# Patient Record
Sex: FEMALE | Race: BLACK OR AFRICAN AMERICAN | NOT HISPANIC OR LATINO
[De-identification: names, ages, dates, MRNs, and addresses within clinical notes are randomized per-mention and may not be internally consistent; named-entity substitution may affect disease eponyms.]

---

## 2017-07-01 ENCOUNTER — HEALTH MAINTENANCE LETTER (OUTPATIENT)
Age: 32
End: 2017-07-01

## 2019-10-03 ENCOUNTER — HEALTH MAINTENANCE LETTER (OUTPATIENT)
Age: 34
End: 2019-10-03

## 2020-11-07 ENCOUNTER — HEALTH MAINTENANCE LETTER (OUTPATIENT)
Age: 35
End: 2020-11-07

## 2021-09-05 ENCOUNTER — HEALTH MAINTENANCE LETTER (OUTPATIENT)
Age: 36
End: 2021-09-05

## 2021-12-26 ENCOUNTER — HEALTH MAINTENANCE LETTER (OUTPATIENT)
Age: 36
End: 2021-12-26

## 2022-10-23 ENCOUNTER — HEALTH MAINTENANCE LETTER (OUTPATIENT)
Age: 37
End: 2022-10-23

## 2023-04-02 ENCOUNTER — HEALTH MAINTENANCE LETTER (OUTPATIENT)
Age: 38
End: 2023-04-02

## 2024-11-06 ENCOUNTER — HOSPITAL ENCOUNTER (EMERGENCY)
Facility: CLINIC | Age: 39
Discharge: HOME | End: 2024-11-06
Attending: EMERGENCY MEDICINE | Admitting: EMERGENCY MEDICINE

## 2024-11-06 VITALS
OXYGEN SATURATION: 97 % | DIASTOLIC BLOOD PRESSURE: 98 MMHG | RESPIRATION RATE: 20 BRPM | TEMPERATURE: 98.5 F | HEART RATE: 74 BPM | SYSTOLIC BLOOD PRESSURE: 147 MMHG

## 2024-11-06 DIAGNOSIS — K08.89 PAIN, DENTAL: ICD-10-CM

## 2024-11-06 PROCEDURE — 99284 EMERGENCY DEPT VISIT MOD MDM: CPT | Performed by: EMERGENCY MEDICINE

## 2024-11-06 PROCEDURE — 99283 EMERGENCY DEPT VISIT LOW MDM: CPT | Performed by: EMERGENCY MEDICINE

## 2024-11-06 RX ORDER — IBUPROFEN 600 MG/1
600 TABLET, FILM COATED ORAL EVERY 8 HOURS PRN
Qty: 30 TABLET | Refills: 0 | Status: SHIPPED | OUTPATIENT
Start: 2024-11-06

## 2024-11-06 RX ORDER — PENICILLIN V POTASSIUM 500 MG/1
500 TABLET, FILM COATED ORAL 2 TIMES DAILY
Qty: 14 TABLET | Refills: 0 | Status: SHIPPED | OUTPATIENT
Start: 2024-11-06 | End: 2024-11-13

## 2024-11-06 RX ORDER — HYDROCODONE BITARTRATE AND ACETAMINOPHEN 5; 325 MG/1; MG/1
1 TABLET ORAL EVERY 6 HOURS PRN
Qty: 10 TABLET | Refills: 0 | Status: SHIPPED | OUTPATIENT
Start: 2024-11-06 | End: 2024-11-09

## 2024-11-06 ASSESSMENT — COLUMBIA-SUICIDE SEVERITY RATING SCALE - C-SSRS
2. HAVE YOU ACTUALLY HAD ANY THOUGHTS OF KILLING YOURSELF IN THE PAST MONTH?: NO
1. IN THE PAST MONTH, HAVE YOU WISHED YOU WERE DEAD OR WISHED YOU COULD GO TO SLEEP AND NOT WAKE UP?: NO
6. HAVE YOU EVER DONE ANYTHING, STARTED TO DO ANYTHING, OR PREPARED TO DO ANYTHING TO END YOUR LIFE?: NO

## 2024-11-06 NOTE — DISCHARGE INSTRUCTIONS
Orajel or Anbesol to affected area. (You may obtain this from any pharmacy)  Tylenol or Ibuprofen for pain.  Use prescription medication as directed.  Follow up with your Dentist or a dental clinic listed below.    Many of these clinics offer a sliding fee option for patients that qualify, and see patients on a walk-in or same day basis. Please call each clinic directly. As services, hours, fees and policies vary greatly.    [place]:  [facility]     [phone]  [facility] ([facility]) [phone]  [facility]  [phone]  [facility]      [phone]   [facility]    [phone]  [facility]  [phone]  [facility]) [phone]  [facility]     [phone]  [facility]   [phone]  [facility] (cash only)   [phone]  [facility]    [phone] (adults)          [phone] (children)    [place]:  [facility]     [phone]; [phone]  [facility]     [phone]  [facility]     [phone]  [facility] (free, limited)    [phone]    Multiple Locations:  [facility]       [phone]

## 2024-11-06 NOTE — ED PROVIDER NOTES
[facility] EMERGENCY DEPARTMENT ([facility])    11/06/24       ED PROVIDER NOTE     History   No chief complaint on file.    HPI  Robin Newell is a 29 year old female no past medical history in Epic who presents the emergency department with dental pain.  Patient reports her left lower wisdom tooth is coming in and causing pain and is requesting a referral to [facility]. NO fever, She tried taking tylenol for pain.       Past Medical History  No past medical history on file.  No past surgical history on file.  HYDROcodone-acetaminophen (NORCO) 5-325 MG tablet  ibuprofen (ADVIL/MOTRIN) 600 MG tablet  penicillin V (VEETID) 500 MG tablet      Allergies   Allergen Reactions    Codeine Hives     Family History  No family history on file.  Social History       A medically appropriate review of systems was performed with pertinent positives and negatives noted in the HPI, and all other systems negative.    Physical Exam      Physical Exam  Constitutional:       General: She is not in acute distress.     Appearance: She is not ill-appearing, toxic-appearing or diaphoretic.   HENT:      Mouth/Throat:      Comments: Left lower molar that is erupting and causing some mild swelling.  Mild tenderness to touch.  No other dental abscesses.  No trismus.  Neurological:      General: No focal deficit present.      Mental Status: She is oriented to person, place, and time.   Psychiatric:         Mood and Affect: Mood normal.         Behavior: Behavior normal.         Thought Content: Thought content normal.           ED Course, Procedures, & Data      Procedures                No results found for any visits on 11/06/24.  Medications - No data to display  Labs Ordered and Resulted from Time of ED Arrival to Time of ED Departure - No data to display  No orders to display          Critical care was not performed.     Medical Decision Making  The patient's presentation was of low complexity (an acute and uncomplicated  illness or injury).    The patient's evaluation involved:  history and exam without other MDM data elements    The patient's management necessitated moderate risk (prescription drug management including medications given in the ED).    Assessment & Plan    Patient is a 39-year-old female who presents to the ER due to dental pain from a wisdom tooth is coming in in her left lower jaw.  Patient has no trismus.  Patient will be referred to an outpatient dentist for further treatment.  She will be given some penicillin to help decrease the swelling and some pain medications for discharge.  No other acute issues.  Patient stable for follow-up.    I have reviewed the nursing notes. I have reviewed the findings, diagnosis, plan and need for follow up with the patient.    New Prescriptions    No medications on file       Final diagnoses:   Pain, dental   I, Bita Davenport, am serving as a trained medical scribe to document services personally performed by Marcela Linton MD, MD, based on the provider's statements to me.     IMarcela MD, MD, was physically present and have reviewed and verified the accuracy of this note documented by Bita Davenport.     Marcela Linton MD  [facility] EMERGENCY DEPARTMENT  11/6/2024     Marcela Linton MD  11/06/24 [phone]

## 2024-11-06 NOTE — ED TRIAGE NOTES
Pt arrives with c/o of tooth pain. Pt reports it is wisdom tooth that is causing pain. Tooth was to eventually be extracted but thought it wouldn't be for some time.

## 2024-12-28 ENCOUNTER — HEALTH MAINTENANCE LETTER (OUTPATIENT)
Age: 39
End: 2024-12-28

## 2025-03-23 ENCOUNTER — HEALTH MAINTENANCE LETTER (OUTPATIENT)
Age: 40
End: 2025-03-23

## 2025-06-14 ENCOUNTER — HOSPITAL ENCOUNTER (EMERGENCY)
Facility: HOSPITAL | Age: 40
Discharge: HOME | End: 2025-06-14
Attending: EMERGENCY MEDICINE | Admitting: EMERGENCY MEDICINE
Payer: MEDICARE

## 2025-06-14 ENCOUNTER — APPOINTMENT (OUTPATIENT)
Dept: CT IMAGING | Facility: HOSPITAL | Age: 40
End: 2025-06-14
Attending: EMERGENCY MEDICINE
Payer: MEDICARE

## 2025-06-14 ENCOUNTER — RESULTS FOLLOW-UP (OUTPATIENT)
Dept: NURSING | Facility: CLINIC | Age: 40
End: 2025-06-14

## 2025-06-14 VITALS
TEMPERATURE: 97.6 F | HEIGHT: 65 IN | HEART RATE: 85 BPM | OXYGEN SATURATION: 97 % | WEIGHT: 236 LBS | DIASTOLIC BLOOD PRESSURE: 60 MMHG | SYSTOLIC BLOOD PRESSURE: 128 MMHG | RESPIRATION RATE: 16 BRPM | BODY MASS INDEX: 39.32 KG/M2

## 2025-06-14 DIAGNOSIS — R10.9 ABDOMINAL PAIN: ICD-10-CM

## 2025-06-14 DIAGNOSIS — R11.2 NAUSEA VOMITING AND DIARRHEA: ICD-10-CM

## 2025-06-14 DIAGNOSIS — R19.7 NAUSEA VOMITING AND DIARRHEA: ICD-10-CM

## 2025-06-14 LAB
ADV 40+41 DNA STL QL NAA+NON-PROBE: NEGATIVE
ALBUMIN SERPL BCG-MCNC: 4.6 G/DL (ref 3.5–5.2)
ALBUMIN UR-MCNC: 20 MG/DL
ALP SERPL-CCNC: 65 U/L (ref 40–150)
ALT SERPL W P-5'-P-CCNC: 30 U/L (ref 0–50)
ANION GAP SERPL CALCULATED.3IONS-SCNC: 14 MMOL/L (ref 7–15)
APPEARANCE UR: ABNORMAL
AST SERPL W P-5'-P-CCNC: 48 U/L (ref 0–45)
ASTRO TYP 1-8 RNA STL QL NAA+NON-PROBE: NEGATIVE
BACTERIA #/AREA URNS HPF: ABNORMAL /HPF
BILIRUB DIRECT SERPL-MCNC: 0.41 MG/DL (ref 0–0.3)
BILIRUB SERPL-MCNC: 1.4 MG/DL
BILIRUB UR QL STRIP: NEGATIVE
BUN SERPL-MCNC: 9.6 MG/DL (ref 6–20)
C CAYETANENSIS DNA STL QL NAA+NON-PROBE: NEGATIVE
C DIFF GDH STL QL IA: NEGATIVE
C DIFF TOX A+B STL QL IA: NEGATIVE
C DIFF TOX B STL QL: POSITIVE
CALCIUM SERPL-MCNC: 9 MG/DL (ref 8.8–10.4)
CAMPYLOBACTER DNA SPEC NAA+PROBE: NEGATIVE
CHLORIDE SERPL-SCNC: 102 MMOL/L (ref 98–107)
COLOR UR AUTO: YELLOW
CREAT SERPL-MCNC: 0.77 MG/DL (ref 0.51–0.95)
CRYPTOSP DNA STL QL NAA+NON-PROBE: NEGATIVE
E COLI O157 DNA STL QL NAA+NON-PROBE: NORMAL
E HISTOLYT DNA STL QL NAA+NON-PROBE: NEGATIVE
EAEC ASTA GENE ISLT QL NAA+PROBE: NEGATIVE
EC STX1+STX2 GENES STL QL NAA+NON-PROBE: NEGATIVE
EGFRCR SERPLBLD CKD-EPI 2021: >90 ML/MIN/1.73M2
EPEC EAE GENE STL QL NAA+NON-PROBE: NEGATIVE
ERYTHROCYTE [DISTWIDTH] IN BLOOD BY AUTOMATED COUNT: 12.7 % (ref 10–15)
ETEC LTA+ST1A+ST1B TOX ST NAA+NON-PROBE: NEGATIVE
ETHANOL SERPL-MCNC: <0.01 G/DL
G LAMBLIA DNA STL QL NAA+NON-PROBE: NEGATIVE
GLUCOSE SERPL-MCNC: 108 MG/DL (ref 70–99)
GLUCOSE UR STRIP-MCNC: NEGATIVE MG/DL
HCG SERPL QL: NEGATIVE
HCO3 SERPL-SCNC: 26 MMOL/L (ref 22–29)
HCT VFR BLD AUTO: 41.5 % (ref 35–47)
HGB BLD-MCNC: 14.1 G/DL (ref 11.7–15.7)
HGB UR QL STRIP: NEGATIVE
KETONES UR STRIP-MCNC: ABNORMAL MG/DL
LABORATORY COMMENT REPORT: ABNORMAL
LEUKOCYTE ESTERASE UR QL STRIP: NEGATIVE
LIPASE SERPL-CCNC: 29 U/L (ref 13–60)
MAGNESIUM SERPL-MCNC: 1.8 MG/DL (ref 1.7–2.3)
MCH RBC QN AUTO: 30.3 PG (ref 26.5–33)
MCHC RBC AUTO-ENTMCNC: 34 G/DL (ref 31.5–36.5)
MCV RBC AUTO: 89 FL (ref 78–100)
MUCOUS THREADS #/AREA URNS LPF: PRESENT /LPF
NITRATE UR QL: NEGATIVE
NOROVIRUS GI+II RNA STL QL NAA+NON-PROBE: NEGATIVE
P SHIGELLOIDES DNA STL QL NAA+NON-PROBE: NEGATIVE
PH UR STRIP: 6 [PH] (ref 5–7)
PHOSPHATE SERPL-MCNC: 3.9 MG/DL (ref 2.5–4.5)
PLATELET # BLD AUTO: 356 10E3/UL (ref 150–450)
POTASSIUM SERPL-SCNC: 3.6 MMOL/L (ref 3.4–5.3)
PROT SERPL-MCNC: 7.5 G/DL (ref 6.4–8.3)
RBC # BLD AUTO: 4.66 10E6/UL (ref 3.8–5.2)
RBC URINE: <1 /HPF
RVA RNA STL QL NAA+NON-PROBE: NEGATIVE
SALMONELLA SP RPOD STL QL NAA+PROBE: NEGATIVE
SAPO I+II+IV+V RNA STL QL NAA+NON-PROBE: NEGATIVE
SHIGELLA SP+EIEC IPAH ST NAA+NON-PROBE: NEGATIVE
SODIUM SERPL-SCNC: 142 MMOL/L (ref 135–145)
SP GR UR STRIP: 1.03 (ref 1–1.03)
SQUAMOUS EPITHELIAL: 11 /HPF
UROBILINOGEN UR STRIP-MCNC: NORMAL MG/DL
V CHOLERAE DNA SPEC QL NAA+PROBE: NEGATIVE
VIBRIO DNA SPEC NAA+PROBE: NEGATIVE
WBC # BLD AUTO: 5.8 10E3/UL (ref 4–11)
WBC URINE: 2 /HPF
Y ENTEROCOL DNA STL QL NAA+PROBE: NEGATIVE

## 2025-06-14 PROCEDURE — 250N000011 HC RX IP 250 OP 636: Performed by: EMERGENCY MEDICINE

## 2025-06-14 PROCEDURE — 87324 CLOSTRIDIUM AG IA: CPT | Performed by: EMERGENCY MEDICINE

## 2025-06-14 PROCEDURE — 87507 IADNA-DNA/RNA PROBE TQ 12-25: CPT | Performed by: EMERGENCY MEDICINE

## 2025-06-14 PROCEDURE — 87493 C DIFF AMPLIFIED PROBE: CPT | Performed by: EMERGENCY MEDICINE

## 2025-06-14 PROCEDURE — 85027 COMPLETE CBC AUTOMATED: CPT | Performed by: EMERGENCY MEDICINE

## 2025-06-14 PROCEDURE — 96375 TX/PRO/DX INJ NEW DRUG ADDON: CPT

## 2025-06-14 PROCEDURE — 250N000013 HC RX MED GY IP 250 OP 250 PS 637: Performed by: EMERGENCY MEDICINE

## 2025-06-14 PROCEDURE — 83735 ASSAY OF MAGNESIUM: CPT | Performed by: EMERGENCY MEDICINE

## 2025-06-14 PROCEDURE — 82310 ASSAY OF CALCIUM: CPT | Performed by: EMERGENCY MEDICINE

## 2025-06-14 PROCEDURE — 82077 ASSAY SPEC XCP UR&BREATH IA: CPT | Performed by: EMERGENCY MEDICINE

## 2025-06-14 PROCEDURE — 82248 BILIRUBIN DIRECT: CPT | Performed by: EMERGENCY MEDICINE

## 2025-06-14 PROCEDURE — 83690 ASSAY OF LIPASE: CPT | Performed by: EMERGENCY MEDICINE

## 2025-06-14 PROCEDURE — 96361 HYDRATE IV INFUSION ADD-ON: CPT

## 2025-06-14 PROCEDURE — 258N000003 HC RX IP 258 OP 636: Performed by: EMERGENCY MEDICINE

## 2025-06-14 PROCEDURE — 99285 EMERGENCY DEPT VISIT HI MDM: CPT | Mod: 25

## 2025-06-14 PROCEDURE — 84703 CHORIONIC GONADOTROPIN ASSAY: CPT | Performed by: EMERGENCY MEDICINE

## 2025-06-14 PROCEDURE — 81001 URINALYSIS AUTO W/SCOPE: CPT | Performed by: EMERGENCY MEDICINE

## 2025-06-14 PROCEDURE — 36415 COLL VENOUS BLD VENIPUNCTURE: CPT | Performed by: EMERGENCY MEDICINE

## 2025-06-14 PROCEDURE — 74177 CT ABD & PELVIS W/CONTRAST: CPT

## 2025-06-14 PROCEDURE — 96374 THER/PROPH/DIAG INJ IV PUSH: CPT | Mod: 59

## 2025-06-14 PROCEDURE — 84100 ASSAY OF PHOSPHORUS: CPT | Performed by: EMERGENCY MEDICINE

## 2025-06-14 RX ORDER — FLUMAZENIL 0.1 MG/ML
0.2 INJECTION, SOLUTION INTRAVENOUS
Status: DISCONTINUED | OUTPATIENT
Start: 2025-06-14 | End: 2025-06-14 | Stop reason: HOSPADM

## 2025-06-14 RX ORDER — OLANZAPINE 5 MG/1
5-10 TABLET, ORALLY DISINTEGRATING ORAL EVERY 6 HOURS PRN
Status: DISCONTINUED | OUTPATIENT
Start: 2025-06-14 | End: 2025-06-14 | Stop reason: HOSPADM

## 2025-06-14 RX ORDER — DIAZEPAM 2 MG/1
2 TABLET ORAL ONCE
Status: COMPLETED | OUTPATIENT
Start: 2025-06-14 | End: 2025-06-14

## 2025-06-14 RX ORDER — CLONIDINE HYDROCHLORIDE 0.1 MG/1
0.1 TABLET ORAL EVERY 8 HOURS
Status: DISCONTINUED | OUTPATIENT
Start: 2025-06-14 | End: 2025-06-14

## 2025-06-14 RX ORDER — GABAPENTIN 300 MG/1
300 CAPSULE ORAL EVERY 8 HOURS
Status: DISCONTINUED | OUTPATIENT
Start: 2025-06-19 | End: 2025-06-14

## 2025-06-14 RX ORDER — GABAPENTIN 300 MG/1
900 CAPSULE ORAL EVERY 8 HOURS
Status: DISCONTINUED | OUTPATIENT
Start: 2025-06-14 | End: 2025-06-14

## 2025-06-14 RX ORDER — MULTIPLE VITAMINS W/ MINERALS TAB 9MG-400MCG
1 TAB ORAL DAILY
Status: DISCONTINUED | OUTPATIENT
Start: 2025-06-14 | End: 2025-06-14

## 2025-06-14 RX ORDER — DIAZEPAM 10 MG/1
10 TABLET ORAL EVERY 30 MIN PRN
Status: DISCONTINUED | OUTPATIENT
Start: 2025-06-14 | End: 2025-06-14 | Stop reason: HOSPADM

## 2025-06-14 RX ORDER — IOPAMIDOL 755 MG/ML
90 INJECTION, SOLUTION INTRAVASCULAR ONCE
Status: COMPLETED | OUTPATIENT
Start: 2025-06-14 | End: 2025-06-14

## 2025-06-14 RX ORDER — GABAPENTIN 300 MG/1
600 CAPSULE ORAL EVERY 8 HOURS
Status: DISCONTINUED | OUTPATIENT
Start: 2025-06-17 | End: 2025-06-14

## 2025-06-14 RX ORDER — THIAMINE HYDROCHLORIDE 100 MG/ML
100 INJECTION, SOLUTION INTRAMUSCULAR; INTRAVENOUS DAILY
Status: DISCONTINUED | OUTPATIENT
Start: 2025-06-14 | End: 2025-06-14 | Stop reason: HOSPADM

## 2025-06-14 RX ORDER — GABAPENTIN 100 MG/1
100 CAPSULE ORAL EVERY 8 HOURS
Status: DISCONTINUED | OUTPATIENT
Start: 2025-06-21 | End: 2025-06-14

## 2025-06-14 RX ORDER — DIAZEPAM 10 MG/2ML
5-10 INJECTION, SOLUTION INTRAMUSCULAR; INTRAVENOUS EVERY 30 MIN PRN
Status: DISCONTINUED | OUTPATIENT
Start: 2025-06-14 | End: 2025-06-14 | Stop reason: HOSPADM

## 2025-06-14 RX ORDER — ONDANSETRON 4 MG/1
4 TABLET, ORALLY DISINTEGRATING ORAL EVERY 6 HOURS PRN
Qty: 10 TABLET | Refills: 0 | Status: SHIPPED | OUTPATIENT
Start: 2025-06-14 | End: 2025-06-17

## 2025-06-14 RX ORDER — HALOPERIDOL 5 MG/ML
2.5-5 INJECTION INTRAMUSCULAR EVERY 6 HOURS PRN
Status: DISCONTINUED | OUTPATIENT
Start: 2025-06-14 | End: 2025-06-14 | Stop reason: HOSPADM

## 2025-06-14 RX ORDER — ONDANSETRON 2 MG/ML
4 INJECTION INTRAMUSCULAR; INTRAVENOUS ONCE
Status: COMPLETED | OUTPATIENT
Start: 2025-06-14 | End: 2025-06-14

## 2025-06-14 RX ORDER — FOLIC ACID 5 MG/ML
1 INJECTION, SOLUTION INTRAMUSCULAR; INTRAVENOUS; SUBCUTANEOUS DAILY
Status: DISCONTINUED | OUTPATIENT
Start: 2025-06-14 | End: 2025-06-14 | Stop reason: HOSPADM

## 2025-06-14 RX ORDER — GABAPENTIN 300 MG/1
1200 CAPSULE ORAL ONCE
Status: COMPLETED | OUTPATIENT
Start: 2025-06-14 | End: 2025-06-14

## 2025-06-14 RX ADMIN — IOPAMIDOL 90 ML: 755 INJECTION, SOLUTION INTRAVENOUS at 11:48

## 2025-06-14 RX ADMIN — SODIUM CHLORIDE 1000 ML: 0.9 INJECTION, SOLUTION INTRAVENOUS at 10:53

## 2025-06-14 RX ADMIN — FOLIC ACID 1 MG: 5 INJECTION, SOLUTION INTRAMUSCULAR; INTRAVENOUS; SUBCUTANEOUS at 11:19

## 2025-06-14 RX ADMIN — THIAMINE HYDROCHLORIDE 100 MG: 100 INJECTION, SOLUTION INTRAMUSCULAR; INTRAVENOUS at 11:03

## 2025-06-14 RX ADMIN — Medication 1 TABLET: at 11:08

## 2025-06-14 RX ADMIN — CLONIDINE HYDROCHLORIDE 0.1 MG: 0.1 TABLET ORAL at 11:08

## 2025-06-14 RX ADMIN — DIAZEPAM 2 MG: 2 TABLET ORAL at 11:29

## 2025-06-14 RX ADMIN — ONDANSETRON 4 MG: 2 INJECTION, SOLUTION INTRAMUSCULAR; INTRAVENOUS at 11:01

## 2025-06-14 ASSESSMENT — ACTIVITIES OF DAILY LIVING (ADL)
ADLS_ACUITY_SCORE: 41
ADLS_ACUITY_SCORE: 45
ADLS_ACUITY_SCORE: 41

## 2025-06-14 ASSESSMENT — LIFESTYLE VARIABLES
TREMOR: 3
AUDITORY DISTURBANCES: NOT PRESENT
AGITATION: NORMAL ACTIVITY
ORIENTATION AND CLOUDING OF SENSORIUM: ORIENTED AND CAN DO SERIAL ADDITIONS
AUDITORY DISTURBANCES: NOT PRESENT
PAROXYSMAL SWEATS: NO SWEAT VISIBLE
NAUSEA AND VOMITING: NO NAUSEA AND NO VOMITING
VISUAL DISTURBANCES: NOT PRESENT
ORIENTATION AND CLOUDING OF SENSORIUM: ORIENTED AND CAN DO SERIAL ADDITIONS
NAUSEA AND VOMITING: INTERMITTENT NAUSEA WITH DRY HEAVES
VISUAL DISTURBANCES: NOT PRESENT
TOTAL SCORE: 0
HEADACHE, FULLNESS IN HEAD: NOT PRESENT
ANXIETY: 3
PAROXYSMAL SWEATS: NO SWEAT VISIBLE
TOTAL SCORE: 12
AGITATION: 2
TREMOR: NO TREMOR
ANXIETY: NO ANXIETY, AT EASE
HEADACHE, FULLNESS IN HEAD: NOT PRESENT

## 2025-06-14 NOTE — ED PROVIDER NOTES
Emergency Department Midlevel Supervisory Note     I personally saw the patient and performed a substantive portion of the visit including all aspects of the medical decision making. I personally made/approved the management plan and take responsibility for the patient management.    ED Course:  10:55 AM Yasmin Trujillo PA-C staffed patient with me. I agree with their assessment and plan of management, and I will see the patient.  10:59 AM I met with the patient to introduce myself, gather additional history, perform my initial exam, and discuss the plan.     40 year old female, history of polysubstance abuse (cocaine, fentanyl and alcohol), MDD, ANGIE and tobacco use disorder, who presents for evaluation of sharp suprapubic abdominal pain x 2 weeks, worse over the past couple of days. She reports associated nausea with multiple episodes of vomiting and diarrhea. No urinary symptoms or fevers.     She also reports drinking ~1.5L vodka daily for the past couple of weeks. Last drink was at 9pm last night. She admits to using cocaine and fentanyl, but denies use within the past week. Alcohol level negative. No tachycardia or tremors to suggest acute withdrawal.     On exam, abdomen is soft with moderate tenderness to palpation suprapubic region and RLQ with mild tenderness to palpation LLQ; no peritoneal signs or CVAT, BL.    IV access established, blood sent for labs and IV fluids initiated.    Labs otherwise remarkable for no leukocytosis, anemia, electrolyte derangements or renal impairment.  No laboratory evidence of hepatitis (AST minimally elevated at 48) with normal alk phos and mildly elevated bilirubin (total 1.4, direct 0.41).  No laboratory evidence of pancreatitis.    UPT negative.  UA contaminated without suggestion of infection.    CT abdomen / pelvis performed and demonstrated a mild pancolitis.    Patient feeling better after Zofran and IV fluids and is tolerating po. She was able to provide a stool sample  and stool culture and C. difficile were ordered and pending at time of disposition.    Patient discharged to home with follow-up with her PCP.  She was given a prescription for ODT Zofran and advised to take a clear liquid diet over the next couple of days and advance slowly, as tolerated.  Return precautions provided.  Patient stable throughout ED course.      FINAL IMPRESSION:    ICD-10-CM    1. Nausea vomiting and diarrhea  R11.2 ED To Primary Care Referral    R19.7       2. Abdominal pain  R10.9 ED To Primary Care Referral          MEDICATIONS GIVEN IN THE EMERGENCY DEPARTMENT:  Medications   ondansetron (ZOFRAN) injection 4 mg (4 mg Intravenous $Given 6/14/25 1101)   sodium chloride 0.9% BOLUS 1,000 mL (0 mLs Intravenous Stopped 6/14/25 1252)   gabapentin (NEURONTIN) capsule 1,200 mg (1,200 mg Oral Not Given 6/14/25 1109)   diazepam (VALIUM) tablet 2 mg (2 mg Oral $Given 6/14/25 1129)   iopamidol (ISOVUE-370) solution 90 mL (90 mLs Intravenous $Given 6/14/25 1148)       NEW PRESCRIPTIONS STARTED AT TODAY'S ED VISIT:  Discharge Medication List as of 6/14/2025  1:46 PM        START taking these medications    Details   ondansetron (ZOFRAN ODT) 4 MG ODT tab Take 1 tablet (4 mg) by mouth every 6 hours as needed for nausea or vomiting., Disp-10 tablet, R-0, Local Print             CHIEF COMPLAINT:  Abdominal Pain; Nausea, Vomiting, & Diarrhea; and Alcohol Problem    Triage Note:    Patient comes in for evaluation of abdominal pain for the past 2 days. Endorses nausea, vomiting, diarrhea.   Has been on a drinking binge for the past two weeks. Last drink last night at 2100.   History of withdrawal seizures.     HPI     The history is provided by the patient (and pt's girlfriend). No  was used.      Beverly Newell is a 40 year old female, history of polysubstance abuse (cocaine, fentanyl and alcohol), MDD, ANGIE and tobacco use disorder, who presents to this ED via walk-in for evaluation of abdominal  pain, nausea, vomiting, diarrhea and alcohol problem.    The patient reports that she has been heavily drinking alcohol for the last couple of weeks, ~1.5L of vodka daily. Her last drink was ~9pm last night. She has previously had alcohol withdrawal with one seizure. She has not used other drugs for a few days. She denies IVDU and usually snorts fentanyl and / or cocaine.     She also reports abdominal pain that started ~2 weeks ago, but significantly worsened a couple of days ago. The pain is located in the suprapubic region. The pain is sharp in nature and worse with eating and when she uses the bathroom. She reports associated nausea with multiple episodes of vomiting. She also reports multiple episodes of diarrhea. She had a small amount of BRBPR in one stool, but this has not recurred. No hematemesis or melena. She denies dysuria and urinary frequency. No fevers.     Her LMP was a couple days ago.     Denies undergoing abdominal surgeries previously.      Medical History     No past medical history on file.    No past surgical history on file.    Family History   Problem Relation Age of Onset    Hypertension Mother     Cancer Mother     Cerebrovascular Disease Mother        Social History     Tobacco Use    Smoking status: Some Days    Smokeless tobacco: Never   Substance Use Topics    Alcohol use: Yes    Drug use: No       ondansetron (ZOFRAN ODT) 4 MG ODT tab  ibuprofen (ADVIL/MOTRIN) 600 MG tablet  sertraline (ZOLOFT) 50 MG tablet  vancomycin (VANCOCIN) 125 MG capsule        Physical Exam     First Vitals:  Patient Vitals for the past 24 hrs:   BP Temp Temp src Pulse Resp SpO2 Height Weight   06/14/25 1308 -- -- -- 85 16 97 % -- --   06/14/25 1258 128/60 -- -- 79 11 97 % -- --   06/14/25 1225 126/64 -- -- 77 10 98 % -- --   06/14/25 1207 -- -- -- 81 21 99 % -- --   06/14/25 1159 126/65 -- -- 82 11 100 % -- --   06/14/25 1108 (!) 159/97 -- -- -- -- -- -- --   06/14/25 1020 (!) 185/140 97.6  F (36.4  C) Oral  "89 20 100 % -- --   06/14/25 1018 -- -- -- -- -- -- 1.651 m (5' 5\") 107 kg (236 lb)       PHYSICAL EXAM:   Physical Exam    GENERAL: Awake, alert.  In no acute distress.   HEENT: Normocephalic, atraumatic.   NECK: No stridor.  PULMONARY: Symmetrical breath sounds without distress.  Lungs clear to auscultation bilaterally without wheezes, rhonchi or rales.  CARDIO: Regular rate and rhythm.  No significant murmur, rub or gallop.  Radial pulses strong and symmetrical.  ABDOMINAL: Abdomen soft, non-distended with moderate tenderness to palpation suprapubic region and RLQ with mild tenderness to palpation LLQ; no rebound tenderness or guarding. No CVAT, BL.  EXTREMITIES: No lower extremity swelling or edema.      NEURO: Alert and oriented to person, place and time.  Cranial nerves grossly intact.  No focal motor deficit.  PSYCH: Normal mood and affect.      Results     LAB:  All pertinent labs reviewed and interpreted  Labs Ordered and Resulted from Time of ED Arrival to Time of ED Departure   BASIC METABOLIC PANEL - Abnormal       Result Value    Sodium 142      Potassium 3.6      Chloride 102      Carbon Dioxide (CO2) 26      Anion Gap 14      Urea Nitrogen 9.6      Creatinine 0.77      GFR Estimate >90      Calcium 9.0      Glucose 108 (*)    HEPATIC FUNCTION PANEL - Abnormal    Protein Total 7.5      Albumin 4.6      Bilirubin Total 1.4 (*)     Alkaline Phosphatase 65      AST 48 (*)     ALT 30      Bilirubin Direct 0.41 (*)    ROUTINE UA WITH MICROSCOPIC REFLEX TO CULTURE - Abnormal    Color Urine Yellow      Appearance Urine Turbid (*)     Glucose Urine Negative      Bilirubin Urine Negative      Ketones Urine Trace (*)     Specific Gravity Urine 1.034 (*)     Blood Urine Negative      pH Urine 6.0      Protein Albumin Urine 20 (*)     Urobilinogen Urine Normal      Nitrite Urine Negative      Leukocyte Esterase Urine Negative      Bacteria Urine Many (*)     Mucus Urine Present (*)     RBC Urine <1      WBC Urine " 2      Squamous Epithelials Urine 11 (*)    CBC WITH PLATELETS - Normal    WBC Count 5.8      RBC Count 4.66      Hemoglobin 14.1      Hematocrit 41.5      MCV 89      MCH 30.3      MCHC 34.0      RDW 12.7      Platelet Count 356     LIPASE - Normal    Lipase 29     ETHANOL LEVEL BLOOD - Normal    Ethanol Level Blood <0.01     HCG QUALITATIVE PREGNANCY - Normal    hCG Serum Qualitative Negative     MAGNESIUM - Normal    Magnesium 1.8     PHOSPHORUS - Normal    Phosphorus 3.9         RADIOLOGY:  CT Abdomen Pelvis w Contrast   Final Result   IMPRESSION:    1.  Mild pan colitis.            I, Linda Acharya, am serving as a scribe to document services personally performed by Cecilia Gray MD based on my observation and the provider's statements to me. I, Cecilia Gray MD attest that Linda Acharya is acting in a scribe capacity, has observed my performance of the services and has documented them in accordance with my direction.    Cecilia Gray MD  Emergency Medicine  [facility] EMERGENCY DEPARTMENT          Cecilia Gray MD  06/15/25 [id number]

## 2025-06-14 NOTE — ED PROVIDER NOTES
EMERGENCY DEPARTMENT ENCOUNTER      NAME: Beverly Newell  AGE: 40 year old female  YOB: 1985  MRN: [id number]  EVALUATION DATE & TIME: 6/14/2025 10:23 AM    PCP: No Ref-Primary, Physician    ED PROVIDER: Yasmin Trujillo PA-C      Chief Complaint   Patient presents with    Abdominal Pain    Nausea, Vomiting, & Diarrhea    Alcohol Problem         FINAL IMPRESSION:  1. Nausea vomiting and diarrhea    2. Abdominal pain          MEDICAL DECISION MAKING:    Pertinent Labs & Imaging studies reviewed. (See chart for details)  40 year old female presents to the Emergency Department for evaluation of abdominal pain, nausea, vomiting and diarrhea for the last few days.  On my evaluation, patient initially hypertensive at 185/140 but otherwise vitally stable.  Examination with abdomen that is soft with mid tenderness to palpation without rebound or guarding.  Heart regular rate and rhythm.  Differential diagnosis included alcohol withdrawal, pancreatitis, bowel obstruction, gastroenteritis.    UA without signs of infection.  Pregnancy negative.  CBC, BMP, LFTs and lipase without significant derangement.  CT abdomen pelvis shows pancolitis.  After medications and fluids given here, patient feeling significantly improved.  Patient does not appear to be in alcohol withdrawal and alcohol level negative.  She was given a small dose of Valium for increased anxiety you have adequate improvement of symptoms.  At this time, patient able to tolerate p.o. and CT with pancolitis.  She was able to give a stool sample here and results are pending at this time.  We discussed clear liquid diet, Zofran for symptoms and close follow-up with primary care as well as reasons to return and patient agreement understanding.  At this time, patient is not in significant alcohol withdrawal with symptomatic improvement and able to tolerate p.o. and I do feel she is appropriate for discharge home.  We discussed safe cessation of alcohol and  again strict return precautions and close follow-up with primary care.  Questions answered best my ability and she was discharged home in stable condition.    Medical Decision Making  Discharge. I prescribed additional prescription strength medication(s) as charted. See documentation for any additional details.    MIPS (CTPE, Dental pain, Rodgers, Sinusitis, Asthma/COPD, Head Trauma): Not Applicable    SEPSIS: None         ED COURSE:  10:30 AM I met with the patient, obtained history, performed an initial exam, and discussed options and plan for diagnostics and treatment here in the ED.    10:55 AM I staffed the patient with Dr. Gray.    1:30 PM discussed results and plan of care and patient in agreement and understanding.     1:45 PM Patient discharged after being provided with extensive anticipatory guidance and given return precautions, importance of PCP follow-up emphasized.    At the conclusion of the encounter I discussed the results of all of the tests and the disposition. The questions were answered. The patient or family acknowledged understanding and was agreeable with the care plan.     MEDICATIONS GIVEN IN THE EMERGENCY:  Medications   ondansetron (ZOFRAN) injection 4 mg (4 mg Intravenous $Given 6/14/25 1101)   sodium chloride 0.9% BOLUS 1,000 mL (0 mLs Intravenous Stopped 6/14/25 1252)   gabapentin (NEURONTIN) capsule 1,200 mg (1,200 mg Oral Not Given 6/14/25 1109)   diazepam (VALIUM) tablet 2 mg (2 mg Oral $Given 6/14/25 1129)   iopamidol (ISOVUE-370) solution 90 mL (90 mLs Intravenous $Given 6/14/25 1148)       NEW PRESCRIPTIONS STARTED AT TODAY'S ER VISIT  Discharge Medication List as of 6/14/2025  1:46 PM        START taking these medications    Details   ondansetron (ZOFRAN ODT) 4 MG ODT tab Take 1 tablet (4 mg) by mouth every 6 hours as needed for nausea or vomiting., Disp-10 tablet, R-0, Local Print                   =================================================================    HPI:    Patient information was obtained from: The patient and significant other    Use of Interpretor: N/A          The patient reports drinking alcohol daily and for the past 2 weeks has been drinking more heavily where she is drinking 1 to 1-1/2 L of vodka per day.  Over the last 2 days she has developed increasing mid abdominal pain with associated nausea, vomiting and diarrhea.  Her symptoms began uncontrolled which prompted her visit to the ER today.  On my evaluation, patient reports her last alcoholic beverage was around 9 PM last night.  She has a history of alcohol withdrawal seizures and her last seizure was 2 to 3 years ago.  She endorses using other drugs such as fentanyl and cocaine however, reports that she has not used in at least a week.  She reports increased anxiety but does not feel tremulous.  No blood in her vomit or stool.  No fevers.  No urinary symptoms.  She just finished her menstrual cycle and declines chance of pregnancy.  No other concerns voiced.      REVIEW OF SYSTEMS:  Negative unless otherwise stated in the above HPI.       PAST MEDICAL HISTORY:  No past medical history on file.    PAST SURGICAL HISTORY:  No past surgical history on file.        CURRENT MEDICATIONS:    No current facility-administered medications for this encounter.    Current Outpatient Medications:     ondansetron (ZOFRAN ODT) 4 MG ODT tab, Take 1 tablet (4 mg) by mouth every 6 hours as needed for nausea or vomiting., Disp: 10 tablet, Rfl: 0    ibuprofen (ADVIL/MOTRIN) 600 MG tablet, Take 1 tablet (600 mg) by mouth every 8 hours as needed for moderate pain., Disp: 30 tablet, Rfl: 0    sertraline (ZOLOFT) 50 MG tablet, Take 1 tablet by mouth daily., Disp: 30 tablet, Rfl: 0      ALLERGIES:  Allergies   Allergen Reactions    Codeine Hives    Codeine Sulfate Hives       FAMILY HISTORY:  Family History   Problem Relation Age of Onset     "Hypertension Mother     Cancer Mother     Cerebrovascular Disease Mother        SOCIAL HISTORY:   Social History     Socioeconomic History    Marital status: Single   Tobacco Use    Smoking status: Some Days    Smokeless tobacco: Never   Substance and Sexual Activity    Alcohol use: Yes    Drug use: No    Sexual activity: Yes     Social Drivers of Health     Financial Resource Strain: Medium Risk (6/21/2024)    Received from [facility]    Overall Financial Resource Strain (CARDIA)     Difficulty of Paying Living Expenses: Somewhat hard   Transportation Needs: Unmet Transportation Needs (6/21/2024)    Received from [facility]    PRAPARE - Transportation     Lack of Transportation (Medical): Yes     Lack of Transportation (Non-Medical): Yes   Housing Stability: High Risk (6/21/2024)    Received from [facility]    Housing Stability     What is your housing situation today?: 1 - I do not have housing (I am staying with others, in a hotel, in a shelter, living outside on ...       VITALS:  Patient Vitals for the past 24 hrs:   BP Temp Temp src Pulse Resp SpO2 Height Weight   06/14/25 1308 -- -- -- 85 16 97 % -- --   06/14/25 1258 128/60 -- -- 79 11 97 % -- --   06/14/25 1225 126/64 -- -- 77 10 98 % -- --   06/14/25 1207 -- -- -- 81 21 99 % -- --   06/14/25 1159 126/65 -- -- 82 11 100 % -- --   06/14/25 1108 (!) 159/97 -- -- -- -- -- -- --   06/14/25 1020 (!) 185/140 97.6  F (36.4  C) Oral 89 20 100 % -- --   06/14/25 1018 -- -- -- -- -- -- 1.651 m (5' 5\") 107 kg (236 lb)       PHYSICAL EXAM    Constitutional: Well developed, Well nourished, NAD  HENT: Normocephalic, Atraumatic, Bilateral external ears normal, Oropharynx normal, mucous membranes moist, Nose normal.   Neck: Normal range of motion, No tenderness, Supple, No stridor.  Eyes: PERRL, EOMI, Conjunctiva normal, No discharge.   Respiratory: Normal breath sounds, No respiratory distress, No wheezing, Speaks full sentences easily. No " cough.  Cardiovascular: Normal heart rate, Regular rhythm, No murmurs, No rubs, No gallops. Chest wall nontender.  GI: Soft, mid abdominal tenderness to palpation, No masses, No flank tenderness. No rebound or guarding.  Musculoskeletal: 2+ DP pulses. No edema. No cyanosis, No clubbing. Good range of motion in all major joints. No tenderness to palpation or major deformities noted. No tenderness of the CTLS spine.   Integument: Warm, Dry, No erythema, No rash. No petechiae.  Neurologic: Alert & oriented x 3, Normal motor function, Normal sensory function, No focal deficits noted. Normal gait.  Psychiatric: Affect normal, Judgment normal, Mood normal. Cooperative.    LAB:  All pertinent labs reviewed and interpreted.  Recent Results (from the past 24 hours)   UA with Microscopic reflex to Culture    Collection Time: 06/14/25 10:40 AM    Specimen: Urine, Clean Catch   Result Value Ref Range    Color Urine Yellow Colorless, Straw, Light Yellow, Yellow    Appearance Urine Turbid (A) Clear    Glucose Urine Negative Negative mg/dL    Bilirubin Urine Negative Negative    Ketones Urine Trace (A) Negative mg/dL    Specific Gravity Urine 1.034 (H) 1.001 - 1.030    Blood Urine Negative Negative    pH Urine 6.0 5.0 - 7.0    Protein Albumin Urine 20 (A) Negative mg/dL    Urobilinogen Urine Normal Normal mg/dL    Nitrite Urine Negative Negative    Leukocyte Esterase Urine Negative Negative    Bacteria Urine Many (A) None Seen /HPF    Mucus Urine Present (A) None Seen /LPF    RBC Urine <1 <=2 /HPF    WBC Urine 2 <=5 /HPF    Squamous Epithelials Urine 11 (H) <=1 /HPF   CBC (+ platelets, no diff)    Collection Time: 06/14/25 10:50 AM   Result Value Ref Range    WBC Count 5.8 4.0 - 11.0 10e3/uL    RBC Count 4.66 3.80 - 5.20 10e6/uL    Hemoglobin 14.1 11.7 - 15.7 g/dL    Hematocrit 41.5 35.0 - 47.0 %    MCV 89 78 - 100 fL    MCH 30.3 26.5 - 33.0 pg    MCHC 34.0 31.5 - 36.5 g/dL    RDW 12.7 10.0 - 15.0 %    Platelet Count 356 150 - 450  10e3/uL   Basic metabolic panel    Collection Time: 06/14/25 10:50 AM   Result Value Ref Range    Sodium 142 135 - 145 mmol/L    Potassium 3.6 3.4 - 5.3 mmol/L    Chloride 102 98 - 107 mmol/L    Carbon Dioxide (CO2) 26 22 - 29 mmol/L    Anion Gap 14 7 - 15 mmol/L    Urea Nitrogen 9.6 6.0 - 20.0 mg/dL    Creatinine 0.77 0.51 - 0.95 mg/dL    GFR Estimate >90 >60 mL/min/1.73m2    Calcium 9.0 8.8 - 10.4 mg/dL    Glucose 108 (H) 70 - 99 mg/dL   Hepatic function panel    Collection Time: 06/14/25 10:50 AM   Result Value Ref Range    Protein Total 7.5 6.4 - 8.3 g/dL    Albumin 4.6 3.5 - 5.2 g/dL    Bilirubin Total 1.4 (H) <=1.2 mg/dL    Alkaline Phosphatase 65 40 - 150 U/L    AST 48 (H) 0 - 45 U/L    ALT 30 0 - 50 U/L    Bilirubin Direct 0.41 (H) 0.00 - 0.30 mg/dL   Lipase    Collection Time: 06/14/25 10:50 AM   Result Value Ref Range    Lipase 29 13 - 60 U/L   Ethanol Level Blood    Collection Time: 06/14/25 10:50 AM   Result Value Ref Range    Ethanol Level Blood <0.01 <=0.01 g/dL   HCG QUALitative pregnancy (blood)    Collection Time: 06/14/25 10:50 AM   Result Value Ref Range    hCG Serum Qualitative Negative Negative   Magnesium    Collection Time: 06/14/25 10:50 AM   Result Value Ref Range    Magnesium 1.8 1.7 - 2.3 mg/dL   Phosphorus    Collection Time: 06/14/25 10:50 AM   Result Value Ref Range    Phosphorus 3.9 2.5 - 4.5 mg/dL         RADIOLOGY:  Reviewed all pertinent imaging. Please see official radiology report.  CT Abdomen Pelvis w Contrast   Final Result   IMPRESSION:    1.  Mild pan colitis.          PROCEDURES:   None.    Yasmin Trujillo PA-C  Emergency Medicine  [facility]  6/14/2025      Yasmin Trujillo PA-C  06/14/25 [id number]

## 2025-06-14 NOTE — ED TRIAGE NOTES
Patient comes in for evaluation of abdominal pain for the past 2 days. Endorses nausea, vomiting, diarrhea.   Has been on a drinking binge for the past two weeks. Last drink last night at 2100.   History of withdrawal seizures.

## 2025-06-14 NOTE — DISCHARGE INSTRUCTIONS
You were seen and evaluated here in the ED for your abdominal pain, nausea, vomiting and diarrhea. Fortunately, work up here in the ED is reassuring other than a pancolitis on CT. We will call with positive stool results if treatment is needed. Otherwise, recommend taking zofran for nausea/vomiting and clear liquid diet for at least 2 days if improving can advance diet.     Will have you follow up closely with primary care and referral placed.    Return with signs or symptoms of alcohol withdrawal, black or bloody stools, uncontrolled vomiting, fevers or other concerns.

## 2025-06-15 ENCOUNTER — HOSPITAL ENCOUNTER (EMERGENCY)
Facility: HOSPITAL | Age: 40
Discharge: HOME | End: 2025-06-15
Attending: EMERGENCY MEDICINE | Admitting: EMERGENCY MEDICINE
Payer: MEDICARE

## 2025-06-15 ENCOUNTER — HOSPITAL ENCOUNTER (EMERGENCY)
Facility: CLINIC | Age: 40
End: 2025-06-15
Payer: MEDICARE

## 2025-06-15 VITALS
SYSTOLIC BLOOD PRESSURE: 128 MMHG | HEART RATE: 80 BPM | TEMPERATURE: 97.3 F | RESPIRATION RATE: 20 BRPM | DIASTOLIC BLOOD PRESSURE: 84 MMHG | HEIGHT: 65 IN | OXYGEN SATURATION: 95 % | WEIGHT: 230 LBS | BODY MASS INDEX: 38.32 KG/M2

## 2025-06-15 DIAGNOSIS — A04.72 C. DIFFICILE COLITIS: ICD-10-CM

## 2025-06-15 DIAGNOSIS — R45.851 SUICIDAL IDEATION: ICD-10-CM

## 2025-06-15 DIAGNOSIS — F10.220 ACUTE ALCOHOLIC INTOXICATION IN ALCOHOLISM WITHOUT COMPLICATION (H): ICD-10-CM

## 2025-06-15 LAB
ANION GAP SERPL CALCULATED.3IONS-SCNC: 11 MMOL/L (ref 7–15)
BASOPHILS # BLD AUTO: 0 10E3/UL (ref 0–0.2)
BASOPHILS NFR BLD AUTO: 0 %
BUN SERPL-MCNC: 9 MG/DL (ref 6–20)
CALCIUM SERPL-MCNC: 8.9 MG/DL (ref 8.8–10.4)
CHLORIDE SERPL-SCNC: 104 MMOL/L (ref 98–107)
CREAT SERPL-MCNC: 0.82 MG/DL (ref 0.51–0.95)
EGFRCR SERPLBLD CKD-EPI 2021: >90 ML/MIN/1.73M2
EOSINOPHIL # BLD AUTO: 0.1 10E3/UL (ref 0–0.7)
EOSINOPHIL NFR BLD AUTO: 2 %
ERYTHROCYTE [DISTWIDTH] IN BLOOD BY AUTOMATED COUNT: 12.6 % (ref 10–15)
ETHANOL SERPL-MCNC: 0.15 G/DL
GLUCOSE SERPL-MCNC: 103 MG/DL (ref 70–99)
HCO3 SERPL-SCNC: 27 MMOL/L (ref 22–29)
HCT VFR BLD AUTO: 39.4 % (ref 35–47)
HGB BLD-MCNC: 13.2 G/DL (ref 11.7–15.7)
IMM GRANULOCYTES # BLD: 0 10E3/UL
IMM GRANULOCYTES NFR BLD: 0 %
LYMPHOCYTES # BLD AUTO: 2.3 10E3/UL (ref 0.8–5.3)
LYMPHOCYTES NFR BLD AUTO: 47 %
MCH RBC QN AUTO: 29.7 PG (ref 26.5–33)
MCHC RBC AUTO-ENTMCNC: 33.5 G/DL (ref 31.5–36.5)
MCV RBC AUTO: 89 FL (ref 78–100)
MONOCYTES # BLD AUTO: 0.3 10E3/UL (ref 0–1.3)
MONOCYTES NFR BLD AUTO: 6 %
NEUTROPHILS # BLD AUTO: 2.2 10E3/UL (ref 1.6–8.3)
NEUTROPHILS NFR BLD AUTO: 45 %
NRBC # BLD AUTO: 0 10E3/UL
NRBC BLD AUTO-RTO: 0 /100
PLATELET # BLD AUTO: 326 10E3/UL (ref 150–450)
POTASSIUM SERPL-SCNC: 3.8 MMOL/L (ref 3.4–5.3)
RBC # BLD AUTO: 4.45 10E6/UL (ref 3.8–5.2)
SODIUM SERPL-SCNC: 142 MMOL/L (ref 135–145)
WBC # BLD AUTO: 4.9 10E3/UL (ref 4–11)

## 2025-06-15 PROCEDURE — 250N000013 HC RX MED GY IP 250 OP 250 PS 637: Performed by: EMERGENCY MEDICINE

## 2025-06-15 PROCEDURE — 80048 BASIC METABOLIC PNL TOTAL CA: CPT | Performed by: EMERGENCY MEDICINE

## 2025-06-15 PROCEDURE — 99283 EMERGENCY DEPT VISIT LOW MDM: CPT

## 2025-06-15 PROCEDURE — 82077 ASSAY SPEC XCP UR&BREATH IA: CPT | Performed by: EMERGENCY MEDICINE

## 2025-06-15 PROCEDURE — 96361 HYDRATE IV INFUSION ADD-ON: CPT

## 2025-06-15 PROCEDURE — 36415 COLL VENOUS BLD VENIPUNCTURE: CPT | Performed by: EMERGENCY MEDICINE

## 2025-06-15 PROCEDURE — 258N000003 HC RX IP 258 OP 636: Performed by: EMERGENCY MEDICINE

## 2025-06-15 PROCEDURE — 250N000011 HC RX IP 250 OP 636: Performed by: EMERGENCY MEDICINE

## 2025-06-15 PROCEDURE — 85004 AUTOMATED DIFF WBC COUNT: CPT | Performed by: EMERGENCY MEDICINE

## 2025-06-15 PROCEDURE — 96360 HYDRATION IV INFUSION INIT: CPT

## 2025-06-15 RX ORDER — VANCOMYCIN HYDROCHLORIDE 125 MG/1
125 CAPSULE ORAL 4 TIMES DAILY
Qty: 40 CAPSULE | Refills: 0 | Status: SHIPPED | OUTPATIENT
Start: 2025-06-15 | End: 2025-06-25

## 2025-06-15 RX ORDER — OXYCODONE HYDROCHLORIDE 5 MG/1
5 TABLET ORAL ONCE
Refills: 0 | Status: COMPLETED | OUTPATIENT
Start: 2025-06-15 | End: 2025-06-15

## 2025-06-15 RX ORDER — ONDANSETRON 4 MG/1
4 TABLET, ORALLY DISINTEGRATING ORAL ONCE
Status: COMPLETED | OUTPATIENT
Start: 2025-06-15 | End: 2025-06-15

## 2025-06-15 RX ORDER — VANCOMYCIN HYDROCHLORIDE 125 MG/1
125 CAPSULE ORAL ONCE
Status: COMPLETED | OUTPATIENT
Start: 2025-06-15 | End: 2025-06-15

## 2025-06-15 RX ADMIN — ONDANSETRON 4 MG: 4 TABLET, ORALLY DISINTEGRATING ORAL at 09:23

## 2025-06-15 RX ADMIN — OXYCODONE HYDROCHLORIDE 5 MG: 5 TABLET ORAL at 09:23

## 2025-06-15 RX ADMIN — VANCOMYCIN HYDROCHLORIDE 125 MG: 125 CAPSULE ORAL at 02:09

## 2025-06-15 RX ADMIN — SODIUM CHLORIDE 1000 ML: 0.9 INJECTION, SOLUTION INTRAVENOUS at 01:08

## 2025-06-15 ASSESSMENT — ACTIVITIES OF DAILY LIVING (ADL)
ADLS_ACUITY_SCORE: 41

## 2025-06-15 ASSESSMENT — COLUMBIA-SUICIDE SEVERITY RATING SCALE - C-SSRS
1. IN THE PAST MONTH, HAVE YOU WISHED YOU WERE DEAD OR WISHED YOU COULD GO TO SLEEP AND NOT WAKE UP?: NO
2. HAVE YOU ACTUALLY HAD ANY THOUGHTS OF KILLING YOURSELF IN THE PAST MONTH?: NO
6. HAVE YOU EVER DONE ANYTHING, STARTED TO DO ANYTHING, OR PREPARED TO DO ANYTHING TO END YOUR LIFE?: NO

## 2025-06-15 NOTE — ED TRIAGE NOTES
"Intoxicated patient who complains of mid ABD pain of 10 for about 3 days. \"I also take substances - Fentanyl, cocaine Ectasy, heroine. Also complains of N/V. VSS, mildly impaired.     Triage Assessment (Adult)       Row Name 06/15/25 0018          Triage Assessment    Airway WDL WDL        Respiratory WDL    Respiratory WDL WDL        Skin Circulation/Temperature WDL    Skin Circulation/Temperature WDL WDL        Cardiac WDL    Cardiac WDL WDL        Peripheral/Neurovascular WDL    Peripheral Neurovascular WDL WDL        Cognitive/Neuro/Behavioral WDL    Cognitive/Neuro/Behavioral WDL WDL                     "

## 2025-06-15 NOTE — PLAN OF CARE
Beverly Newell  Betty 15, 2025  Plan of Care Hand-off Note     Patient Recommended Care Path: discharge    Clinical Substantiation:  After therapeutic assessment, intervention, and aftercare planning by ED care team and LM and in consultation with attending provider, the patient's circumstances and mental state were appropriate for outpatient management. It is the recommendation of this clinician that pt discharge with OP MH support. Currently the pt is not presenting as an acute risk to self or others due to the following factors: Pt reports to alcohol and substance use. Pt appears to be in the pre-contemplative stage of change. Pt reports she is willing to think about being sober and is open to resources to be able to reach out for support should she choose to be sober and get help doing so. Pt denied any active thoughts, intent, plans to harm herself and states she feels she is safe and can be safe discharging with her significant other Paul.    Goals for crisis stabilization:  Pt is encouraged to consider obtaining a BAKARI / comp assessment to looking into her substance use and then to follow the recommendations at that time. Pt is hopeful that she will be securing her own apartment soon, this brings her a lot of hope and adams. Pt states she also loves her nephew who doesn't like to see her while under the influence and she can work to be sober for him. Pt is open to receiving resources currently and will take the next steps when she chooses to be sober. Pt will continue to see her PCP as needed. Pt declined therapy, med management, and BAKARI services offered during this assessment.    Next steps for Care Team:  Please print off safety plan for pt prior to discharge.    Treatment Objectives Addressed:  rapport building, assessing safety, safety planning, identifying an appropriate aftercare plan, identifying additional supports    Therapeutic Interventions:  Engaged in safety planning, Reviewed healthy living that  supports positive mental health, including looking at sleep hygiene, regular movement, nutrition, and regular socialization., Identified and practiced coping skills.    Has a specific means been identified for suicidal.homicide actions: No    Patient coping skills attempted to reduce the crisis:  Music and singing    Collateral contact information:  Niya girlfriend [phone]    Legal Status: Voluntary/Patient has signed consent for treatment                                                                     Reviewed court records: yes     Psychiatry Consult: Not at this time.    Dunia Nuñez, LICSW

## 2025-06-15 NOTE — ED PROVIDER NOTES
EMERGENCY DEPARTMENT ENCOUNTER      NAME: Beverly Newell  AGE: 40 year old female  YOB: 1985  MRN: [id number]  EVALUATION DATE & TIME: No admission date for patient encounter.    PCP: No Ref-Primary, Physician    ED PROVIDER: Devang Esquivel D.O.      Chief Complaint   Patient presents with    Abdominal Pain       FINAL IMPRESSION:  1. Suicidal ideation    2. Acute alcoholic intoxication in alcoholism without complication (H)    3. C. difficile colitis        ED COURSE & MEDICAL DECISION MAKIN:35 AM I met with the patient, obtained history, performed an initial exam, and discussed options and plan for diagnostics and treatment here in the ED.   7:07 AM patient care turned over to Dr. Franco       Pertinent Labs & Imaging studies reviewed. (See chart for details)  40 year old female presents to the Emergency Department for evaluation of suicidal thoughts.  Patient was quite obviously intoxicated upon my initial evaluation.  She was seen earlier in the evening, for abdominal pain which had negative testing including negative imaging.  She did not have any significant tenderness on my exam, therefore I did not believe she required repeat imaging.  EtOH is quite elevated on labs, likely the source of the patient's acute intoxication.  However she is endorsing multiple times suicidal thoughts and desire to harm herself.  Therefore, we will have her evaluated by DEC.  At time of turnover, patient is still pending evaluation by DEC, and with final disposition pending their evaluation.  Of note, the patient did test positive for  C. difficile colitis, therefore I did give her a dose of oral vancomycin.    Medical Decision Making  Care impacted by Alcohol and/or Drug Abuse or Dependence  Admission considered. Patient was signed out to the oncoming physician, disposition pending.    MIPS (CTPE, Dental pain, Rodgers, Sinusitis, Asthma/COPD, Head Trauma): Not Applicable    SEPSIS: None          At the conclusion of  "the encounter I discussed the results of all of the tests and the disposition. The questions were answered. The patient or family acknowledged understanding and was agreeable with the care plan.        HPI    Patient information was obtained from: the patient and a friend    Use of : N/A       Beverly Newell is a 40 year old female with a pertinent history of tobacco use disorder and cocaine use disorder in early remission who presents to this emergency department by walk-in for evaluation of abdominal pain.    Per the patient's friend, the patient was evaluated in the emergency department yesterday (6/14/2025) for lower abdominal pain. She was given IV fluids and a CT scan, labs and stool sample were obtained. She returns to the emergency department due to worsening abdominal pain, this time in her mid abdomen. She has had intermittent chills, but no measured fever. The patient was given Valium at her last emergency department visit with significant improvement in her anxiety.     Per the patient, she states that she drinks a lot of alcohol. She reports that her blood pressure is elevated. She thinks that the abdominal pain returned because she drank alcohol after she was discharged yesterday. She states that \"I'm not gonna not drink, it's just not my thing\". The patient endorses active suicidal ideation, stating that \"I'm just going through a lot\". She is feeling anxious right now.     Per Chart Review, the patient was seen on 6/14/2025 at [facility] Emergency Department for evaluation of abdominal pain, nausea, vomiting and diarrhea. She was initially hypertensive at 185/140, but otherwise vitally stable. Urinalysis was without signs of infection. Pregnancy negative. CBC, BMP, LFTs and lipase were without significant derangement. CT abdomen pelvis revealed pancolitis. She had significant improvement in the emergency department with medications and fluids given here. She did not appear to be in alcohol " withdrawal and alcohol level was negative. She was given a small dose of Valium for increased anxiety. She gave a stool sample and results are pending. Recommended a liquid diet, Zofran for symptoms and close follow-up with primary care. Safe cessation of alcohol was discussed and the patient was discharged.       PAST MEDICAL HISTORY:  No past medical history on file.    PAST SURGICAL HISTORY:  No past surgical history on file.      CURRENT MEDICATIONS:    No current facility-administered medications for this encounter.     Current Outpatient Medications   Medication Sig Dispense Refill    ibuprofen (ADVIL/MOTRIN) 600 MG tablet Take 1 tablet (600 mg) by mouth every 8 hours as needed for moderate pain. 30 tablet 0    ondansetron (ZOFRAN ODT) 4 MG ODT tab Take 1 tablet (4 mg) by mouth every 6 hours as needed for nausea or vomiting. 10 tablet 0    sertraline (ZOLOFT) 50 MG tablet Take 1 tablet by mouth daily. 30 tablet 0         ALLERGIES:  Allergies   Allergen Reactions    Codeine Hives    Codeine Sulfate Hives       FAMILY HISTORY:  Family History   Problem Relation Age of Onset    Hypertension Mother     Cancer Mother     Cerebrovascular Disease Mother        SOCIAL HISTORY:  Social History     Socioeconomic History    Marital status: Single   Tobacco Use    Smoking status: Some Days    Smokeless tobacco: Never   Substance and Sexual Activity    Alcohol use: Yes    Drug use: No    Sexual activity: Yes     Social Drivers of Health     Financial Resource Strain: Medium Risk (6/21/2024)    Received from [facility] [place]    Overall Financial Resource Strain (CARDIA)     Difficulty of Paying Living Expenses: Somewhat hard   Transportation Needs: Unmet Transportation Needs (6/21/2024)    Received from [facility] [place]    PRAPARE - Transportation     Lack of Transportation (Medical): Yes     Lack of Transportation (Non-Medical): Yes   Housing Stability: High Risk (6/21/2024)    Received from [facility] [place]     "Housing Stability     What is your housing situation today?: 1 - I do not have housing (I am staying with others, in a hotel, in a shelter, living outside on ...       VITALS:  Patient Vitals for the past 24 hrs:   BP Temp Temp src Pulse Resp SpO2 Height Weight   06/15/25 0529 128/84 -- -- 80 -- 95 % -- --   06/15/25 0016 (!) 162/106 97.3  F (36.3  C) Temporal 81 20 100 % 1.651 m (5' 5\") 104.3 kg (230 lb)       PHYSICAL EXAM    VITAL SIGNS: /84   Pulse 80   Temp 97.3  F (36.3  C) (Temporal)   Resp 20   Ht 1.651 m (5' 5\")   Wt 104.3 kg (230 lb)   LMP 06/13/2025 (Approximate)   SpO2 95%   BMI 38.27 kg/m      General Appearance: Intoxicated-appearing, well-nourished.  Head:  Normocephalic, without obvious abnormality, atraumatic  Eyes:  PERRL, conjunctiva/corneas clear, EOM's intact,  ENT:  Lips, mucosa, and tongue normal, membranes are moist without pallor  Neck:  Normal ROM, symmetrical, trachea midline    Cardio:  Regular rate and rhythm, no murmur, rub or gallop, 2+ pulses symmetric in all extremities  Pulm:  Clear to auscultation bilaterally, respirations unlabored.  Abdomen:  Soft, no rebound or guarding. Mid abdominal tenderness.   Musculoskeletal: Full ROM, no edema, no cyanosis, good ROM of major joints  Integument:  Warm, Dry, No erythema, No rash.    Neurologic:  Alert & oriented.  No focal deficits appreciated.    Psychiatric:  Affect normal, Judgment normal. Verbalizes suicidal thoughts.     LABS  Results for orders placed or performed during the hospital encounter of 06/15/25 (from the past 24 hours)   CBC with platelets + differential    Narrative    The following orders were created for panel order CBC with platelets + differential.  Procedure                               Abnormality         Status                     ---------                               -----------         ------                     CBC with platelets and ...[[id number]]                      Final result             "     Please view results for these tests on the individual orders.   Basic metabolic panel   Result Value Ref Range    Sodium 142 135 - 145 mmol/L    Potassium 3.8 3.4 - 5.3 mmol/L    Chloride 104 98 - 107 mmol/L    Carbon Dioxide (CO2) 27 22 - 29 mmol/L    Anion Gap 11 7 - 15 mmol/L    Urea Nitrogen 9.0 6.0 - 20.0 mg/dL    Creatinine 0.82 0.51 - 0.95 mg/dL    GFR Estimate >90 >60 mL/min/1.73m2    Calcium 8.9 8.8 - 10.4 mg/dL    Glucose 103 (H) 70 - 99 mg/dL   Ethanol Level Blood   Result Value Ref Range    Ethanol Level Blood 0.15 (H) <=0.01 g/dL   CBC with platelets and differential   Result Value Ref Range    WBC Count 4.9 4.0 - 11.0 10e3/uL    RBC Count 4.45 3.80 - 5.20 10e6/uL    Hemoglobin 13.2 11.7 - 15.7 g/dL    Hematocrit 39.4 35.0 - 47.0 %    MCV 89 78 - 100 fL    MCH 29.7 26.5 - 33.0 pg    MCHC 33.5 31.5 - 36.5 g/dL    RDW 12.6 10.0 - 15.0 %    Platelet Count 326 150 - 450 10e3/uL    % Neutrophils 45 %    % Lymphocytes 47 %    % Monocytes 6 %    % Eosinophils 2 %    % Basophils 0 %    % Immature Granulocytes 0 %    NRBCs per 100 WBC 0 <1 /100    Absolute Neutrophils 2.2 1.6 - 8.3 10e3/uL    Absolute Lymphocytes 2.3 0.8 - 5.3 10e3/uL    Absolute Monocytes 0.3 0.0 - 1.3 10e3/uL    Absolute Eosinophils 0.1 0.0 - 0.7 10e3/uL    Absolute Basophils 0.0 0.0 - 0.2 10e3/uL    Absolute Immature Granulocytes 0.0 <=0.4 10e3/uL    Absolute NRBCs 0.0 10e3/uL         RADIOLOGY  No orders to display          MEDICATIONS GIVEN IN THE EMERGENCY:  Medications   vancomycin (VANCOCIN) capsule 125 mg (125 mg Oral $Given 6/15/25 020)   sodium chloride 0.9% BOLUS 1,000 mL (1,000 mLs Intravenous $New Bag 6/15/25 0105)       NEW PRESCRIPTIONS STARTED AT TODAY'S ER VISIT  New Prescriptions    No medications on file        I, Amanda Briceno, am serving as a scribe to document services personally performed by Devang Esquivel D.O., based on my observations and the provider's statements to me.  I, Devang Esquivel D.O., attest that Amanda  Kaity is acting in a scribe capacity, has observed my performance of the services and has documented them in accordance with my direction.     Devang Esquivel D.O.  Emergency Medicine  [facility] EMERGENCY DEPARTMENT  [address] [place] [id number] [phone]  Dept: [phone]       Devang Esquivel,   06/15/25 0707

## 2025-06-15 NOTE — DISCHARGE INSTRUCTIONS
Take the prescribed antibiotics as directed to treat a possible C. difficile colitis infection.      Please follow-up with the outpatient chemical dependency resources that were provided to you.      Return back to ED sooner for any worsening pain, fevers, suicidal thoughts, or any other new or concerning symptoms.     *[facility] Intake  (type CD intake in the search field)  [email or web address]  [phone]   inpatient services [phone]  or [phone] To arrange an appointment with CD counselor   [facility]  [email or web address]  [phone] Hours vary, call for information  Rule 25 assessments  Counseling & assessments  For CD & outpatient treatment   [place]  [facility])  [email or web address]   [phone] [address], [facility]  Residential drug and alcohol programs serving teens and adults from all ethnic, socioeconomic and Congregational backgrounds       [facility]                                     [phone]                        [place] [facility] site  [email or web address]

## 2025-06-15 NOTE — CONSULTS
"Diagnostic Evaluation Consultation  Crisis Assessment    Patient Name: Beverly Newell  Age:  40 year old  Legal Sex: female  Gender Identity: female  Pronouns:  she / her    Race: Black or   Ethnicity: Not  or   Language: English    Patient was assessed: Virtual: GonnaBe   Crisis Assessment Start Date: 06/15/25  Crisis Assessment Start Time: 0628  Crisis Assessment Stop Time: 0732  Patient location: [facility] Emergency Department                               Referral Data and Chief Complaint  Beverly Newell presents to the ED by  self (Via [employer]). Patient is presenting to the ED for the following concerns: Intoxication, Health stressors. Factors that make the mental health crisis life threatening or complex are: Pt presents to ED for complaints of alcohol intoxication and health stressors. Pt states her stomach has been hurting for the past couple of days and returns back again to the ED because of continued abdominal pain. She states she was also drinking alcohol and feels this could be attributing to her symptoms. Pt reports drinking \"for as long as I have been alive\", then states \"since I was 19\". Pt states she drinks 2 pints of alcohol daily. Pt has tried to quit drinking on her own in the past, without professional support. She can't remember what her longest period of sobriety has been. Pt states she was drinking 2 years ago while using substances and overdosed unintentionally. Last year pt states she intentionally overdosed 2 times and did not seek medication care. Pt reports she hasn't been eating or sleeping for the past 2 weeks. Pt feels like her girlfriend of a year has been cheating on her. Pt endorses family stress and does not want to elaborate further. Pt states she is homeless, has been for 3 years and is not sure she wants to be sober currently. Pt currently denied any thoughts, intent, plans to harm herself or others. Pt is open to receiving " "resources should she decide to become sober and is declining any other services. Pt states she has been staying with her girlfriend and her family and is working to get her own place. Pt is excited to get her own apartment. Pt is requesting to discharge with her significant other.    Informed Consent and Assessment Methods  Explained the crisis assessment process, including applicable information disclosures and limits to confidentiality, assessed understanding of the process, and obtained consent to proceed with the assessment.  Assessment methods included conducting a formal interview with patient, review of medical records, collaboration with medical staff, and obtaining relevant collateral information from family and community providers when available.  : done     History of the Crisis   Pt reports a hx of Fetal Alcohol Syndrome, depression, and anxiety. Pt reports she has taken gapapentin in the past for her mental health and doesn't take anything now. Pt denied any previous IP MH placements, day treatment, PHP, BAKARI treatment, and can't remember if she went to detox in the past or not. Pt reports attending therapy one month ago, didn't find it useful so stopped attending. Pt declines therapy, med management, and BAKARI support currently. She is open to receiving resources so she can access support if and when she wants it.    Brief Psychosocial History  Family:  Lives with Significant Other, Children no  Support System:  Significant Other, Parent(s), Sibling(s)  Employment Status:  unemployed, disabled  Source of Income:  disability, public assistance  Financial Environmental Concerns:  none  Current Hobbies:  music, other (see comments) (Loves to sing)  Barriers in Personal Life:  lack of motivation, other (see comments) (Substance Use)    Significant Clinical History  Current Anxiety Symptoms:  excessive worry, anxious (I'm alway worried\")  Current Depression/Trauma:  withdrawl/isolation, excessive guilt, " sadness, irritable, difficulty concentrating, apathy, crying or feels like crying, low self esteem, hopelessness, helplessness  Current Somatic Symptoms:  anxious, excessive worry  Current Psychosis/Thought Disturbance:  forgetful, high risk behavior (Drug use is high risk)  Current Eating Symptoms:  loss of appetite  Chemical Use History:  Alcohol: Daily  Last Use: 06/14/25  Benzodiazepines: None  Last Use:  (Unknown)  Opiates: Heroin other (Method; smoking)  Last Use: 06/01/25  Cocaine: Snorted  Last Use: 06/08/25  Marijuana: None  Other Use: Other (comments) (fentanyl about a week ago)  Last Use: 06/08/25  Withdrawal Symptoms:  (Pt denied any concerns for withdrawal currently.)  Addictions:  (None endorsed)   Past diagnosis:  Anxiety Disorder, Depression, Substance Use Disorder, Suicide attempt(s)  Family history:  Anxiety Disorder, Depression, Other, Substance Use Disorder (Feels she heard stories of family completing suicide but does not fully know.)  Past treatment:  Individual therapy, Primary Care  Details of most recent treatment:  Pt has a PCP through [facility].  Other relevant history:  Pt does not know if she has any legal issues currently. Pt is staying with her girlfriend in [place].    Have there been any medication changes in the past two weeks:  no       Is the patient compliant with medications:  no  Pt states she takes gabapentin, hasn't though for about one month.     Collateral Information  Is there collateral information: Yes     Collateral information name, relationship, phone number:  Niya girlfriend [phone]    What happened today: Pt was having stomach pains. Pt said she was having severe stomach pains, felt like she was going to throw up. Went to the ED and was given lab work, they left and came back. Pt had more labs that came back positive for C-diff. Pt was given antibiotics while in the ED and stayed overnight in ED.     What is different about patient's  "functioning: Nothing eye catching. Stomach issues cause her to be a little off and could be because of C-diff.     Has patient made comments about wanting to kill themselves/others: no    If d/c is recommended, can they take part in safety/aftercare planning: yes    Additional collateral information:  Pt has been staying with Paul and per Keeara pt can go back with her for as long as she needs to get back on her feet.     Risk Assessment  [place] Suicide Severity Rating Scale Full Clinical Version:  Suicidal Ideation  Q1 Wish to be Dead (Lifetime): Yes  Q2 Non-Specific Active Suicidal Thoughts (Lifetime): Yes  3. Active Suicidal Ideation with any Methods (Not Plan) Without Intent to Act (Lifetime): Yes  4. Active Suicidal Ideation with Some Intent to Act, Without Specific Plan (Lifetime): Yes  5. Active Suicidal Ideation with Specific Plan and Intent (Lifetime): Yes  Q6 Suicide Behavior (Lifetime): yes  Intensity of Ideation (Lifetime)  Most Severe Ideation Rating (Lifetime): 4  Frequency (Lifetime): Daily or almost daily  Duration (Lifetime): 4-8 hours/most of day  Controllability (Lifetime): Can control thoughts with little difficulty  Deterrents (Lifetime): Deterrents definitely stopped you from attempting suicide  Reasons for Ideation (Lifetime): Does not apply  Suicidal Behavior (Lifetime)  Actual Attempt (Lifetime): Yes  Total Number of Actual Attempts (Lifetime): 2  Actual Attempt Description (Lifetime): 2 times pt attempted to overdose on substances. Most recently last year sometime, \"either last winter or the winter before that\".  Has subject engaged in non-suicidal self-injurious behavior? (Lifetime): Yes (20 years old did self harm via cutting, stopped around the age of 20, \"Because that stuff hurts\".)  Interrupted Attempts (Lifetime): No  Aborted or Self-Interrupted Attempt (Lifetime): No  Preparatory Acts or Behavior (Lifetime): No    [place] Suicide Severity Rating Scale Recent:   Suicidal Ideation " "(Recent)  Q1 Wished to be Dead (Past Month): yes  Q2 Suicidal Thoughts (Past Month): yes  Q3 Suicidal Thought Method: no  Q4 Suicidal Intent without Specific Plan: no  Q5 Suicide Intent with Specific Plan: no  Level of Risk per Screen: low risk  Intensity of Ideation (Recent)  Most Severe Ideation Rating (Past 1 Month): 5  Description of Most Severe Ideation (Past 1 Month): 5 in past month, 3 now.  Frequency (Past 1 Month): Daily or almost daily (\"2 times in last month\".)  Duration (Past 1 Month):  (\"depends what I am feeling that day\")  Controllability (Past 1 Month): Can control thoughts with some difficulty  Deterrents (Past 1 Month): Deterrents definitely stopped you from attempting suicide  Reasons for Ideation (Past 1 Month): Does not apply  Suicidal Behavior (Recent)  Actual Attempt (Past 3 Months): Yes  Total Number of Actual Attempts (Past 3 Months): 1  Actual Attempt Description (Past 3 Months): \"doing heroin 2 months ago\", pt states this could have been last winter, she is not clear on dates because she doesn't remember dates.  Has subject engaged in non-suicidal self-injurious behavior? (Past 3 Months): No  Interrupted Attempts (Past 3 Months): No  Total Number of Interrupted Attempts (Past 3 Months): 0  Aborted or Self-Interrupted Attempt (Past 3 Months): No  Total Number of Aborted or Self-Interrupted Attempts (Past 3 Months): 0  Preparatory Acts or Behavior (Past 3 Months): No  Total Number of Preparatory Acts (Past 3 Months): 0    Environmental or Psychosocial Events: history of TBI, ongoing abuse of substances, unstable housing, homelessness, challenging interpersonal relationships, helplessness/hopelessness, unemployment/underemployment  Protective Factors: Protective Factors: strong bond to family unit, community support, or employment, lives in a responsibly safe and stable environment, cultural, spiritual , or Denominational beliefs associated with meaning and value in life, constructive use of " leisure time, enjoyable activities, resilience, sense of belonging    Does the patient have thoughts of harming others? Feels Like Hurting Others: no  Previous Attempt to Hurt Others: no  Current presentation:  (Pt is calm and cooperative, seems oriented.)  Is the patient engaging in sexually inappropriate behavior?: no  Does Patient have a known history of aggressive behavior: No  Has aggression occurred as a result of MH concerns/diagnosis: None endorsed  Does patient have history of aggression in hospital: None endorsed    Is the patient engaging in sexually inappropriate behavior?  no        Mental Status Exam   Affect: Appropriate  Appearance: Appropriate  Attention Span/Concentration: Attentive  Eye Contact: Engaged    Fund of Knowledge: Appropriate   Language /Speech Content: Fluent  Language /Speech Volume: Soft  Language /Speech Rate/Productions: Normal  Recent Memory: Intact, Variable  Remote Memory: Intact, Variable  Mood: Sad, Normal  Orientation to Person: Yes   Orientation to Place: Yes  Orientation to Time of Day: Yes  Orientation to Date: Yes     Situation (Do they understand why they are here?): Yes  Psychomotor Behavior: Normal  Thought Content: Clear  Thought Form: Intact      Medication  Psychotropic medications: None     Current Care Team  Patient Care Team:  No Ref-Primary, Physician as PCP - General    Diagnosis  Patient Active Problem List   Diagnosis Code    Moderate major depression (H) F32.9    Tobacco use disorders     CARDIOVASCULAR SCREENING; LDL GOAL LESS THAN 160 Z13.6     Primary Problem This Admission  Active Hospital Problems  F32.9  Moderate major depression (H)    Clinical Summary and Substantiation of Recommendations   Clinical Substantiation:  After therapeutic assessment, intervention, and aftercare planning by ED care team and LMHP and in consultation with attending provider, the patient's circumstances and mental state were appropriate for outpatient management. It is the  recommendation of this clinician that pt discharge with OP MH support. Currently the pt is not presenting as an acute risk to self or others due to the following factors: Pt reports to alcohol and substance use. Pt appears to be in the pre-contemplative stage of change. Pt reports she is willing to think about being sober and is open to resources to be able to reach out for support should she choose to be sober and get help doing so. Pt denied any active thoughts, intent, plans to harm herself and states she feels she is safe and can be safe discharging with her significant other Paul.    Goals for crisis stabilization:  Pt is encouraged to consider obtaining a BAKARI / comp assessment to looking into her substance use and then to follow the recommendations at that time. Pt is hopeful that she will be securing her own apartment soon, this brings her a lot of hope and adams. Pt states she also loves her nephew who doesn't like to see her while under the influence and she can work to be sober for him. Pt is open to receiving resources currently and will take the next steps when she chooses to be sober. Pt will continue to see her PCP as needed. Pt declined therapy, med management, and BAKARI services offered during this assessment.    Next steps for Care Team:  Please print off safety plan for pt prior to discharge.    Treatment Objectives Addressed:  rapport building, assessing safety, safety planning, identifying an appropriate aftercare plan, identifying additional supports    Therapeutic Interventions:  Engaged in safety planning, Reviewed healthy living that supports positive mental health, including looking at sleep hygiene, regular movement, nutrition, and regular socialization., Identified and practiced coping skills.    Has a specific means been identified for suicidal/homicide actions: No    Patient coping skills attempted to reduce the crisis:  Music and singing    Disposition  Recommended referrals: Individual  Therapy, BAKARI Comprehensive Assessment, Medication Management        Reviewed case and recommendations with attending provider. Attending Name: Dr Franco       Attending concurs with disposition: yes       Patient and/or validated legal guardian concurs with disposition: yes       Final disposition:  discharge      Legal status: Voluntary/Patient has signed consent for treatment                                                                           Reviewed court records: yes     Assessment Details   Total duration spent with the patient: 63 min     CPT code(s) utilized: 70859 - Psychotherapy for Crisis - 60 (30-74*) min    Dunia Nuñez [facility], Psychotherapist  [facility] - Triage & Transition Services  Callback: [phone]

## 2025-06-15 NOTE — ED NOTES
EMERGENCY DEPARTMENT SIGN OUT NOTE        ED COURSE AND MEDICAL DECISION MAKING  Patient was signed out to me by Dr Devang Esquivel at 7:05 AM  7:40 AM I spoke to Dunia from DEC  and was told patient is not Suicidal. She can be discharged with chemical dependency resources.   8:11 AM spoke to Dr. SASHA Dyson, [facility], regarding patient.  8:54 AM I rechecked and updated patient.     In brief, Beverly Newell is a 40 year old female who initially presented to the ED for evaluation of abdominal pain. Patient  reports drinking a lot of alcohol yesterday after being discharged, and reports her blood pressure was elevated. She thinks the abdominal pain returned due to consumption of alcohol. Patient also reports active suicidal ideation.     At time of sign out, DEC assessment and disposition were pending.    I spoke with the DEC  after she was evaluated.  DEC  states that the patient is no longer suicidal and therefore does not need inpatient evaluation.  DEC  recommended outpatient chemical dependency treatment.    The patient's laboratory test drawn yesterday returned back today.  The patient's C. difficile PCR testing was positive but the C. difficile GDH antigen and C. difficile toxin were both negative.  The patient's case was discussed with the on-call infectious disease provider Dr. Dyson who stated the patient should probably still be treated for C. difficile given the finding of pancolitis noted on the CT scan of her abdomen yesterday.      The patient was reevaluated both she and her significant other were informed of the test results.  The patient will be sent home with a course of vancomycin to treat the suspected C. difficile infection.  Patient was instructed to seek outpatient treatment for chemical dependency or to return to the ED sooner for any worsening suicidal ideation, abdominal pain, diarrhea, fevers, or any other new or  concerning symptoms.      FINAL IMPRESSION    1. Suicidal ideation    2. Acute alcoholic intoxication in alcoholism without complication (H)    3. C. difficile colitis        ED MEDS  Medications   vancomycin (VANCOCIN) capsule 125 mg (125 mg Oral $Given 6/15/25 0207)   sodium chloride 0.9% BOLUS 1,000 mL (0 mLs Intravenous Stopped 6/15/25 0877)       LAB  Labs Ordered and Resulted from Time of ED Arrival to Time of ED Departure   BASIC METABOLIC PANEL - Abnormal       Result Value    Sodium 142      Potassium 3.8      Chloride 104      Carbon Dioxide (CO2) 27      Anion Gap 11      Urea Nitrogen 9.0      Creatinine 0.82      GFR Estimate >90      Calcium 8.9      Glucose 103 (*)    ETHANOL LEVEL BLOOD - Abnormal    Ethanol Level Blood 0.15 (*)    CBC WITH PLATELETS AND DIFFERENTIAL    WBC Count 4.9      RBC Count 4.45      Hemoglobin 13.2      Hematocrit 39.4      MCV 89      MCH 29.7      MCHC 33.5      RDW 12.6      Platelet Count 326      % Neutrophils 45      % Lymphocytes 47      % Monocytes 6      % Eosinophils 2      % Basophils 0      % Immature Granulocytes 0      NRBCs per 100 WBC 0      Absolute Neutrophils 2.2      Absolute Lymphocytes 2.3      Absolute Monocytes 0.3      Absolute Eosinophils 0.1      Absolute Basophils 0.0      Absolute Immature Granulocytes 0.0      Absolute NRBCs 0.0         RADIOLOGY    No orders to display       DISCHARGE MEDS  New Prescriptions    VANCOMYCIN (VANCOCIN) 125 MG CAPSULE    Take 1 capsule (125 mg) by mouth 4 times daily for 10 days.         Joan Honeycutt DO [facility] EMERGENCY DEPARTMENT  [facility] [facility] [id number] [phone]       Tangela Franco DO  06/15/25 3414